# Patient Record
Sex: FEMALE | Race: WHITE | NOT HISPANIC OR LATINO | URBAN - METROPOLITAN AREA
[De-identification: names, ages, dates, MRNs, and addresses within clinical notes are randomized per-mention and may not be internally consistent; named-entity substitution may affect disease eponyms.]

---

## 2020-08-27 ENCOUNTER — TELEPHONE ENCOUNTER (OUTPATIENT)
Dept: URBAN - METROPOLITAN AREA CLINIC 23 | Facility: CLINIC | Age: 55
End: 2020-08-27

## 2020-10-07 ENCOUNTER — OFFICE VISIT (OUTPATIENT)
Dept: URBAN - METROPOLITAN AREA TELEHEALTH 2 | Facility: TELEHEALTH | Age: 55
End: 2020-10-07
Payer: COMMERCIAL

## 2020-10-07 DIAGNOSIS — K22.70 BARRETT ESOPHAGUS: ICD-10-CM

## 2020-10-07 DIAGNOSIS — K63.5 COLON POLYPS: ICD-10-CM

## 2020-10-07 DIAGNOSIS — R12 HEARTBURN: ICD-10-CM

## 2020-10-07 DIAGNOSIS — R19.7 DIARRHEA: ICD-10-CM

## 2020-10-07 PROCEDURE — G8420 CALC BMI NORM PARAMETERS: HCPCS | Performed by: INTERNAL MEDICINE

## 2020-10-07 PROCEDURE — G9903 PT SCRN TBCO ID AS NON USER: HCPCS | Performed by: INTERNAL MEDICINE

## 2020-10-07 PROCEDURE — G8427 DOCREV CUR MEDS BY ELIG CLIN: HCPCS | Performed by: INTERNAL MEDICINE

## 2020-10-07 PROCEDURE — 1036F TOBACCO NON-USER: CPT | Performed by: INTERNAL MEDICINE

## 2020-10-07 PROCEDURE — 99213 OFFICE O/P EST LOW 20 MIN: CPT | Performed by: INTERNAL MEDICINE

## 2020-10-07 PROCEDURE — 3017F COLORECTAL CA SCREEN DOC REV: CPT | Performed by: INTERNAL MEDICINE

## 2020-10-07 NOTE — HPI-TODAY'S VISIT:
states that she is recently diagnosed with osteoporosis.  she is taking otc nexium 20 mg daily.  she still has breakthrough sx despite this.  she has tried  a variety of otc and prescription meds.  she has no trouble swallowing.  no abdominal pain.  has nighttime symptoms.

## 2020-12-11 ENCOUNTER — OFFICE VISIT (OUTPATIENT)
Dept: URBAN - METROPOLITAN AREA LAB 3 | Facility: LAB | Age: 55
End: 2020-12-11

## 2020-12-22 ENCOUNTER — OFFICE VISIT (OUTPATIENT)
Dept: URBAN - METROPOLITAN AREA SURGERY CENTER 15 | Facility: SURGERY CENTER | Age: 55
End: 2020-12-22
Payer: COMMERCIAL

## 2020-12-22 ENCOUNTER — CLAIMS CREATED FROM THE CLAIM WINDOW (OUTPATIENT)
Dept: URBAN - METROPOLITAN AREA CLINIC 4 | Facility: CLINIC | Age: 55
End: 2020-12-22
Payer: COMMERCIAL

## 2020-12-22 DIAGNOSIS — K21.00 GASTRO-ESOPHAGEAL REFLUX DISEASE WITH ESOPHAGITIS, WITHOUT BLEEDING: ICD-10-CM

## 2020-12-22 DIAGNOSIS — K21.9 ACID REFLUX: ICD-10-CM

## 2020-12-22 DIAGNOSIS — K31.89 ACQUIRED DEFORMITY OF DUODENUM: ICD-10-CM

## 2020-12-22 DIAGNOSIS — K31.7 POLYP OF STOMACH AND DUODENUM: ICD-10-CM

## 2020-12-22 PROCEDURE — 43239 EGD BIOPSY SINGLE/MULTIPLE: CPT | Performed by: INTERNAL MEDICINE

## 2020-12-22 PROCEDURE — G8907 PT DOC NO EVENTS ON DISCHARG: HCPCS | Performed by: INTERNAL MEDICINE

## 2020-12-22 PROCEDURE — 88312 SPECIAL STAINS GROUP 1: CPT | Performed by: PATHOLOGY

## 2020-12-22 PROCEDURE — 88305 TISSUE EXAM BY PATHOLOGIST: CPT | Performed by: PATHOLOGY

## 2021-01-13 ENCOUNTER — TELEPHONE ENCOUNTER (OUTPATIENT)
Dept: URBAN - METROPOLITAN AREA CLINIC 23 | Facility: CLINIC | Age: 56
End: 2021-01-13

## 2021-03-03 ENCOUNTER — OFFICE VISIT (OUTPATIENT)
Dept: URBAN - METROPOLITAN AREA CLINIC 12 | Facility: CLINIC | Age: 56
End: 2021-03-03

## 2021-09-22 ENCOUNTER — DASHBOARD ENCOUNTERS (OUTPATIENT)
Age: 56
End: 2021-09-22

## 2021-09-22 ENCOUNTER — WEB ENCOUNTER (OUTPATIENT)
Dept: URBAN - METROPOLITAN AREA CLINIC 12 | Facility: CLINIC | Age: 56
End: 2021-09-22

## 2021-09-22 ENCOUNTER — OFFICE VISIT (OUTPATIENT)
Dept: URBAN - METROPOLITAN AREA CLINIC 12 | Facility: CLINIC | Age: 56
End: 2021-09-22
Payer: COMMERCIAL

## 2021-09-22 DIAGNOSIS — K22.70 BARRETT ESOPHAGUS: ICD-10-CM

## 2021-09-22 DIAGNOSIS — R12 HEARTBURN: ICD-10-CM

## 2021-09-22 DIAGNOSIS — K63.5 COLON POLYPS: ICD-10-CM

## 2021-09-22 DIAGNOSIS — K76.9 LIVER LESION: ICD-10-CM

## 2021-09-22 DIAGNOSIS — R19.7 DIARRHEA: ICD-10-CM

## 2021-09-22 DIAGNOSIS — M81.0 OSTEOPOROSIS, UNSPECIFIED OSTEOPOROSIS TYPE, UNSPECIFIED PATHOLOGICAL FRACTURE PRESENCE: ICD-10-CM

## 2021-09-22 PROBLEM — 92065004 BENIGN NEOPLASM OF COLON: Status: ACTIVE | Noted: 2020-10-07

## 2021-09-22 PROBLEM — 276661002 AGE-RELATED OSTEOPOROSIS: Status: ACTIVE | Noted: 2021-09-22

## 2021-09-22 PROBLEM — 302914006 BARRETT ESOPHAGUS: Status: ACTIVE | Noted: 2020-10-07

## 2021-09-22 PROBLEM — 300331000: Status: ACTIVE | Noted: 2021-09-22

## 2021-09-22 PROBLEM — 62315008 DIARRHEA: Status: ACTIVE | Noted: 2020-10-07

## 2021-09-22 PROCEDURE — 99214 OFFICE O/P EST MOD 30 MIN: CPT | Performed by: INTERNAL MEDICINE

## 2021-09-22 NOTE — HPI-TODAY'S VISIT:
states that she is recently diagnosed with osteoporosis.  she is taking otc nexium 20 mg daily.  she still has breakthrough sx despite this.  she has tried  a variety of otc and prescription meds.  she has no trouble swallowing.  no abdominal pain.  has nighttime symptoms. ===================================================================== 9/21/2021 The patient underwent an upper endoscopy in December 2020. Biopsies were taken from the distal esophagus for possible Law's but all showed reflux related changes with no evidence of Law's tissue. This was gastric mucosa. No dysplasia or intestinal metaplasia identified. Gastric biopsies were unremarkable. On today's visit she states that she is 'not doing great' -she states that in March  abruptly she developed swelling/distension.  went to er and didn't find anythiing that would be contributing- had fatty depsition in the liver, bochaldek hernia and a renal lesion.  she then had a MRI of the kidney in 3/2021- had 4/23/2021- showed 1.5 cm lesion with hypointense T1 lesion- review showed enhancing lesion in right hepatic lobe that may be hepatocellular  such as benign hepatic adenoma, cannot rule out other lesion.   -she tried to get repeat MRI however this was denied -she just had bloodwork checked in August  0she states that she has been taking pepcid only for reflux- with osteoporosis stopped nexium altogether .  pepcid had been working however had a little more nighttime reflux  she states that she is working to sleep elevated.

## 2021-10-28 ENCOUNTER — TELEPHONE ENCOUNTER (OUTPATIENT)
Dept: URBAN - METROPOLITAN AREA CLINIC 49 | Facility: CLINIC | Age: 56
End: 2021-10-28

## 2021-11-07 LAB
AFP, SERUM, TUMOR MARKER: 8.6
ALBUMIN: 4.4
ALKALINE PHOSPHATASE: 118
ALT (SGPT): 13
AST (SGOT): 17
BILIRUBIN, DIRECT: <0.1
BILIRUBIN, TOTAL: 0.5
DEAMIDATED GLIADIN ABS, IGA: 5
DEAMIDATED GLIADIN ABS, IGG: 3
ENDOMYSIAL ANTIBODY IGA: NEGATIVE
IMMUNOGLOBULIN A, QN, SERUM: 168
PROTEIN, TOTAL: 6.8
T-TRANSGLUTAMINASE (TTG) IGA: <2
T-TRANSGLUTAMINASE (TTG) IGG: <2

## 2021-11-09 ENCOUNTER — TELEPHONE ENCOUNTER (OUTPATIENT)
Dept: URBAN - METROPOLITAN AREA CLINIC 92 | Facility: CLINIC | Age: 56
End: 2021-11-09

## 2021-11-18 ENCOUNTER — TELEPHONE ENCOUNTER (OUTPATIENT)
Dept: URBAN - METROPOLITAN AREA CLINIC 23 | Facility: CLINIC | Age: 56
End: 2021-11-18

## 2021-11-23 ENCOUNTER — TELEPHONE ENCOUNTER (OUTPATIENT)
Dept: URBAN - METROPOLITAN AREA CLINIC 6 | Facility: CLINIC | Age: 56
End: 2021-11-23

## 2022-02-09 ENCOUNTER — TELEPHONE ENCOUNTER (OUTPATIENT)
Dept: URBAN - METROPOLITAN AREA CLINIC 12 | Facility: CLINIC | Age: 57
End: 2022-02-09

## 2022-11-28 ENCOUNTER — TELEPHONE ENCOUNTER (OUTPATIENT)
Dept: URBAN - METROPOLITAN AREA CLINIC 23 | Facility: CLINIC | Age: 57
End: 2022-11-28